# Patient Record
Sex: FEMALE | Race: WHITE | NOT HISPANIC OR LATINO | URBAN - METROPOLITAN AREA
[De-identification: names, ages, dates, MRNs, and addresses within clinical notes are randomized per-mention and may not be internally consistent; named-entity substitution may affect disease eponyms.]

---

## 2018-04-23 ENCOUNTER — APPOINTMENT (RX ONLY)
Dept: URBAN - METROPOLITAN AREA CLINIC 57 | Facility: CLINIC | Age: 18
Setting detail: DERMATOLOGY
End: 2018-04-23

## 2018-04-23 VITALS — HEART RATE: 98 BPM | DIASTOLIC BLOOD PRESSURE: 75 MMHG | SYSTOLIC BLOOD PRESSURE: 103 MMHG

## 2018-04-23 DIAGNOSIS — L20.89 OTHER ATOPIC DERMATITIS: ICD-10-CM | Status: INADEQUATELY CONTROLLED

## 2018-04-23 PROCEDURE — ? TREATMENT REGIMEN

## 2018-04-23 PROCEDURE — 99213 OFFICE O/P EST LOW 20 MIN: CPT

## 2018-04-23 PROCEDURE — ? PRESCRIPTION

## 2018-04-23 PROCEDURE — ? PREDNISONE TAPER

## 2018-04-23 PROCEDURE — ? COUNSELING

## 2018-04-23 PROCEDURE — ? EDUCATIONAL RESOURCES PROVIDED

## 2018-04-23 RX ORDER — DESONIDE 0.5 MG/G
CREAM TOPICAL BID
Qty: 1 | Refills: 3 | Status: ERX | COMMUNITY
Start: 2018-04-23

## 2018-04-23 RX ORDER — PREDNISONE 10 MG/1
TABLET ORAL AS DIRECTED
Qty: 42 | Refills: 0 | Status: ERX | COMMUNITY
Start: 2018-04-23

## 2018-04-23 RX ORDER — TRIAMCINOLONE ACETONIDE 1 MG/G
CREAM TOPICAL BID
Qty: 1 | Refills: 1 | Status: ERX | COMMUNITY
Start: 2018-04-23

## 2018-04-23 RX ADMIN — PREDNISONE: 10 TABLET ORAL at 21:44

## 2018-04-23 RX ADMIN — TRIAMCINOLONE ACETONIDE: 1 CREAM TOPICAL at 20:31

## 2018-04-23 RX ADMIN — DESONIDE: 0.5 CREAM TOPICAL at 21:39

## 2018-04-23 ASSESSMENT — LOCATION ZONE DERM: LOCATION ZONE: TRUNK

## 2018-04-23 ASSESSMENT — LOCATION SIMPLE DESCRIPTION DERM: LOCATION SIMPLE: UPPER BACK

## 2018-04-23 ASSESSMENT — LOCATION DETAILED DESCRIPTION DERM: LOCATION DETAILED: SUPERIOR THORACIC SPINE

## 2018-04-23 NOTE — PROCEDURE: TREATMENT REGIMEN
Plan: Discussed SEs of prednisone\\nRec moisturizing daily after showering and before applying medicine\\n\\nDiscussed treatment with NVUVB light lee. Coverage checked today with [facility]. Patient amenable to coming in [facility].\\nDiscussed treatment with Dupixent if not well controlled by topicals and phototherapy\\n\\nPt has previously tried:Olux-E foam, cordran cream, cordran ointment, coconut oil.  Disc case with [facility]
Initiate Treatment: Prednisone 10 mg - Take 4 tablets po qd x3 days, 3 tablets po qd x3 days, 2 tablets po qd x1 wk, 1 tablet po qd x1 week\\nTAC 0.1 % - aaa of arms, legs, and trunk BID x2 weeks on, 1 week off. Avoid face groin and underarms\\nDesonide cream - aaa of face/arm/groin BID x1 wk on 1 wk off
Detail Level: Simple